# Patient Record
Sex: MALE | Race: OTHER | Employment: UNEMPLOYED | URBAN - METROPOLITAN AREA
[De-identification: names, ages, dates, MRNs, and addresses within clinical notes are randomized per-mention and may not be internally consistent; named-entity substitution may affect disease eponyms.]

---

## 2024-04-26 ENCOUNTER — HOSPITAL ENCOUNTER (EMERGENCY)
Facility: HOSPITAL | Age: 59
Discharge: HOME | End: 2024-04-26
Attending: EMERGENCY MEDICINE
Payer: MEDICAID

## 2024-04-26 ENCOUNTER — APPOINTMENT (OUTPATIENT)
Dept: CT IMAGING | Facility: HOSPITAL | Age: 59
End: 2024-04-26
Attending: EMERGENCY MEDICINE
Payer: MEDICAID

## 2024-04-26 VITALS
HEIGHT: 68 IN | RESPIRATION RATE: 20 BRPM | HEART RATE: 76 BPM | WEIGHT: 250 LBS | OXYGEN SATURATION: 96 % | BODY MASS INDEX: 37.89 KG/M2 | DIASTOLIC BLOOD PRESSURE: 71 MMHG | SYSTOLIC BLOOD PRESSURE: 118 MMHG | TEMPERATURE: 98 F

## 2024-04-26 DIAGNOSIS — N30.00 ACUTE CYSTITIS WITHOUT HEMATURIA: Primary | ICD-10-CM

## 2024-04-26 LAB
BILIRUB UR QL: NEGATIVE
COLOR UR: YELLOW
GLUCOSE UR-MCNC: NORMAL MG/DL
KETONES UR-MCNC: NEGATIVE MG/DL
LEUKOCYTE ESTERASE UR QL STRIP.AUTO: 500
NITRITE UR QL STRIP.AUTO: NEGATIVE
PH UR: 5.5 [PH] (ref 5–8)
PROT UR-MCNC: 70 MG/DL
RBC #/AREA URNS AUTO: >10 /HPF
SP GR UR STRIP: 1.02 (ref 1–1.03)
UROBILINOGEN UR STRIP-ACNC: 6
WBC #/AREA URNS AUTO: >50 /HPF
WBC CLUMPS UR QL AUTO: PRESENT /HPF

## 2024-04-26 PROCEDURE — 87186 SC STD MICRODIL/AGAR DIL: CPT | Performed by: EMERGENCY MEDICINE

## 2024-04-26 PROCEDURE — 81001 URINALYSIS AUTO W/SCOPE: CPT | Performed by: EMERGENCY MEDICINE

## 2024-04-26 PROCEDURE — 99285 EMERGENCY DEPT VISIT HI MDM: CPT

## 2024-04-26 PROCEDURE — 99284 EMERGENCY DEPT VISIT MOD MDM: CPT

## 2024-04-26 PROCEDURE — 74176 CT ABD & PELVIS W/O CONTRAST: CPT | Performed by: EMERGENCY MEDICINE

## 2024-04-26 PROCEDURE — 87088 URINE BACTERIA CULTURE: CPT | Performed by: EMERGENCY MEDICINE

## 2024-04-26 PROCEDURE — 87086 URINE CULTURE/COLONY COUNT: CPT | Performed by: EMERGENCY MEDICINE

## 2024-04-26 RX ORDER — ACETAMINOPHEN 500 MG
1000 TABLET ORAL ONCE
Status: COMPLETED | OUTPATIENT
Start: 2024-04-26 | End: 2024-04-26

## 2024-04-26 RX ORDER — CEPHALEXIN 500 MG/1
500 CAPSULE ORAL 4 TIMES DAILY
Qty: 40 CAPSULE | Refills: 0 | Status: SHIPPED | OUTPATIENT
Start: 2024-04-26 | End: 2024-05-06

## 2024-04-26 RX ORDER — CEPHALEXIN 500 MG/1
500 CAPSULE ORAL ONCE
Status: COMPLETED | OUTPATIENT
Start: 2024-04-26 | End: 2024-04-26

## 2024-04-26 NOTE — ED INITIAL ASSESSMENT (HPI)
Patient reports dysuria and \"specks\" in urine that began yesterday. Reported fever and chills, unknown temp.

## 2024-04-27 NOTE — ED PROVIDER NOTES
Patient Seen in: [facility] Emergency Department    History     Chief Complaint   Patient presents with    Urinary Symptoms       HPI    The patient presents to the ED complaining of urinary frequency, small months of urine and pain with urination.  He states that he had a fever today but denies any flank pain, vomiting or other complaints.    History reviewed.   Past Medical History:    Essential hypertension       History reviewed. History reviewed. No pertinent surgical history.      Medications :  (Not in a hospital admission)       No family history on file.    Smoking Status:   Social History     Socioeconomic History    Marital status:    Tobacco Use    Smoking status: Never    Smokeless tobacco: Never   Vaping Use    Vaping status: Never Used   Substance and Sexual Activity    Alcohol use: Yes    Drug use: Never       Constitutional and vital signs reviewed.      Social History and Family History elements reviewed from today, pertinent positives to the presenting problem noted.    Physical Exam     ED Triage Vitals   BP 04/26/24 1535 143/81   Pulse 04/26/24 1535 103   Resp 04/26/24 1535 20   Temp 04/26/24 1535 98.9 °F (37.2 °C)   Temp src 04/26/24 1535 Temporal   SpO2 04/26/24 1535 96 %   O2 Device 04/26/24 1830 None (Room air)       All measures to prevent infection transmission during my interaction with the patient were taken. Handwashing was performed prior to and after the exam.  Stethoscope and any equipment used during my examination was cleaned with super sani-cloth germicidal wipes following the exam.     Physical Exam  Vitals and nursing note reviewed.   Constitutional:       General: He is not in acute distress.     Appearance: He is well-developed. He is not ill-appearing.   HENT:      Head: Normocephalic and atraumatic.   Eyes:      General:         Right eye: No discharge.         Left eye: No discharge.      Conjunctiva/sclera: Conjunctivae normal.   Neck:      Trachea: No  tracheal deviation.   Cardiovascular:      Rate and Rhythm: Normal rate.   Pulmonary:      Effort: Pulmonary effort is normal. No respiratory distress.      Breath sounds: No stridor.   Abdominal:      General: There is no distension.      Palpations: Abdomen is soft.      Tenderness: There is no abdominal tenderness. There is no right CVA tenderness or left CVA tenderness.   Musculoskeletal:         General: No deformity.   Skin:     General: Skin is warm and dry.   Neurological:      Mental Status: He is alert and oriented to person, place, and time.   Psychiatric:         Mood and Affect: Mood normal.         Behavior: Behavior normal.         ED Course        Labs Reviewed   URINALYSIS WITH CULTURE REFLEX - Abnormal; Notable for the following components:       Result Value    Clarity Urine Turbid (*)     Blood Urine 2+ (*)     Protein Urine 70 (*)     Urobilinogen Urine 6 (*)     Leukocyte Esterase Urine 500 (*)     WBC Urine >50 (*)     RBC Urine >10 (*)     Squamous Epi. Cells Few (*)     WBC Clump Present (*)     All other components within normal limits   URINE CULTURE, ROUTINE       As Interpreted by me    Imaging Results Available and Reviewed while in ED: CT ABDOMEN+PELVIS KIDNEYSTONE 2D RNDR(NO IV,NO ORAL)(CPT=74176)    Result Date: 4/26/2024  CONCLUSION:  1. A nonobstructing right calyceal calculus is demonstrated. No evidence of ureterolithiasis or obstructive hydronephrosis.  2. Diffuse bladder wall thickening and perivesical fat stranding, concerning for cystitis.  3. Prostatomegaly with probable chronic outlet obstruction.   4. Uncomplicated distal colonic diverticulosis.  5. Hepatomegaly with underlying hepatic steatosis.  6. Splenomegaly.  7. Dilatation of the main pulmonary artery trunk may relate to underlying pulmonary hypertension.  8. Lesser incidental findings as above.      Dictated by (CST): Oni Pineda MD on 4/26/2024 at 6:44 PM     Finalized by (CST): Oni Pineda MD on 4/26/2024  at 6:52 PM         ED Medications Administered:   Medications   acetaminophen (Tylenol Extra Strength) tab 1,000 mg (1,000 mg Oral Given 4/26/24 1642)   cephalexin (Keflex) cap 500 mg (500 mg Oral Given 4/26/24 1741)         MDM     Vitals:    04/26/24 1631 04/26/24 1742 04/26/24 1830 04/26/24 1837   BP:   118/71    Pulse:   76    Resp:       Temp: (!) 101.2 °F (38.4 °C) 100.3 °F (37.9 °C)  98.3 °F (36.8 °C)   TempSrc: Oral Oral  Oral   SpO2:   96%    Weight:       Height:         *I personally reviewed and interpreted all ED vitals.    Pulse Ox: 96%, Room air, Normal     Monitor Interpretation:   normal sinus rhythm as interpreted by me.  The cardiac monitor was ordered to monitor heart rate.    Differential Diagnosis/ Diagnostic Considerations: UTI, urinary retention, urolithiasis, other    Complicating Factors: The patient already has does not have a problem list on file. to contribute to the complexity of this ED evaluation.    Medical Decision Making  The patient presents to the ED with urinary tract infection symptoms.  Nondistressed on examination.  Urinalysis with obvious infection.  No evidence for postvoid residual given post residual volume of around 50 miles after urination.  CT abdomen pelvis without evidence for obstructing ureteral stone or other signs for infection.  Patient started on antibiotics in the ED will be discharged home with the same.  Recommended urology follow-up and return precautions.    Problems Addressed:  Acute cystitis without hematuria: acute illness or injury that poses a threat to life or bodily functions    Amount and/or Complexity of Data Reviewed  Labs: ordered. Decision-making details documented in ED Course.  Radiology: ordered and independent interpretation performed. Decision-making details documented in ED Course.     Details: I personally reviewed the patient's CT abdomen pelvis and noted no obstructing urolithiasis    Risk  Prescription drug  management.        Condition upon leaving the department: Stable    Disposition and Plan     Clinical Impression:  1. Acute cystitis without hematuria        Disposition:  Discharge    Follow-up:  Verónica Hooks  [address]  [address]  [place] [facility] [address] [phone]    Schedule an appointment as soon as possible for a visit in 3 day(s)      Nilda Mccormick MD  [address]  [address]  [place] [address] [phone]    Schedule an appointment as soon as possible for a visit in 1 week(s)        Medications Prescribed:  Discharge Medication List as of 4/26/2024  6:31 PM        START taking these medications    Details   cephalexin (KEFLEX) 500 MG Oral Cap Take 1 capsule (500 mg total) by mouth 4 (four) times daily for 10 days., Normal, Disp-40 capsule, R-0

## 2024-04-28 NOTE — PROGRESS NOTES
ED Culture Callback Results Review    Pharmacist reviewed culture results from ED visit .    Final urine culture positive for E coli. Patient was prescribed Cephalexin (Keflex) on discharge. Current therapy is appropriate based on reported susceptibilities. No further intervention required at this time.    Jose Miguel Roy, PharmD  Emergency Medicine Pharmacist Specialist  04/28/24; 11:17 AM

## 2024-12-27 ENCOUNTER — APPOINTMENT (OUTPATIENT)
Dept: ULTRASOUND IMAGING | Facility: HOSPITAL | Age: 59
End: 2024-12-27
Attending: EMERGENCY MEDICINE
Payer: MEDICAID

## 2024-12-27 ENCOUNTER — HOSPITAL ENCOUNTER (EMERGENCY)
Facility: HOSPITAL | Age: 59
Discharge: HOME | End: 2024-12-27
Attending: EMERGENCY MEDICINE
Payer: MEDICAID

## 2024-12-27 VITALS
RESPIRATION RATE: 18 BRPM | SYSTOLIC BLOOD PRESSURE: 135 MMHG | HEART RATE: 88 BPM | BODY MASS INDEX: 36.37 KG/M2 | WEIGHT: 240 LBS | TEMPERATURE: 99 F | OXYGEN SATURATION: 95 % | DIASTOLIC BLOOD PRESSURE: 83 MMHG | HEIGHT: 68 IN

## 2024-12-27 DIAGNOSIS — N45.1 EPIDIDYMITIS: Primary | ICD-10-CM

## 2024-12-27 DIAGNOSIS — N45.2 ORCHITIS: ICD-10-CM

## 2024-12-27 LAB
ANION GAP SERPL CALC-SCNC: 7 MMOL/L (ref 0–18)
BASOPHILS # BLD AUTO: 0.05 X10(3) UL (ref 0–0.2)
BASOPHILS NFR BLD AUTO: 0.4 %
BILIRUB UR QL: NEGATIVE
BUN BLD-MCNC: 11 MG/DL (ref 9–23)
BUN/CREAT SERPL: 10.1 (ref 10–20)
CALCIUM BLD-MCNC: 9.4 MG/DL (ref 8.7–10.4)
CHLORIDE SERPL-SCNC: 102 MMOL/L (ref 98–112)
CLARITY UR: CLEAR
CO2 SERPL-SCNC: 29 MMOL/L (ref 21–32)
COLOR UR: YELLOW
CREAT BLD-MCNC: 1.09 MG/DL
DEPRECATED RDW RBC AUTO: 36.8 FL (ref 35.1–46.3)
EGFRCR SERPLBLD CKD-EPI 2021: 78 ML/MIN/1.73M2 (ref 60–?)
EOSINOPHIL # BLD AUTO: 0.04 X10(3) UL (ref 0–0.7)
EOSINOPHIL NFR BLD AUTO: 0.3 %
ERYTHROCYTE [DISTWIDTH] IN BLOOD BY AUTOMATED COUNT: 12.1 % (ref 11–15)
GLUCOSE BLD-MCNC: 135 MG/DL (ref 70–99)
GLUCOSE UR-MCNC: NORMAL MG/DL
HCT VFR BLD AUTO: 45.1 %
HGB BLD-MCNC: 15.2 G/DL
HGB UR QL STRIP.AUTO: NEGATIVE
IMM GRANULOCYTES # BLD AUTO: 0.13 X10(3) UL (ref 0–1)
IMM GRANULOCYTES NFR BLD: 1 %
KETONES UR-MCNC: NEGATIVE MG/DL
LEUKOCYTE ESTERASE UR QL STRIP.AUTO: 250
LYMPHOCYTES # BLD AUTO: 1.08 X10(3) UL (ref 1–4)
LYMPHOCYTES NFR BLD AUTO: 8.7 %
MCH RBC QN AUTO: 28.3 PG (ref 26–34)
MCHC RBC AUTO-ENTMCNC: 33.7 G/DL (ref 31–37)
MCV RBC AUTO: 84 FL
MONOCYTES # BLD AUTO: 0.79 X10(3) UL (ref 0.1–1)
MONOCYTES NFR BLD AUTO: 6.3 %
NEUTROPHILS # BLD AUTO: 10.39 X10 (3) UL (ref 1.5–7.7)
NEUTROPHILS # BLD AUTO: 10.39 X10(3) UL (ref 1.5–7.7)
NEUTROPHILS NFR BLD AUTO: 83.3 %
NITRITE UR QL STRIP.AUTO: NEGATIVE
OSMOLALITY SERPL CALC.SUM OF ELEC: 287 MOSM/KG (ref 275–295)
PH UR: 8 [PH] (ref 5–8)
PLATELET # BLD AUTO: 313 10(3)UL (ref 150–450)
POTASSIUM SERPL-SCNC: 4.4 MMOL/L (ref 3.5–5.1)
PROT UR-MCNC: 20 MG/DL
RBC # BLD AUTO: 5.37 X10(6)UL
RBC #/AREA URNS AUTO: >10 /HPF
SODIUM SERPL-SCNC: 138 MMOL/L (ref 136–145)
SP GR UR STRIP: 1.03 (ref 1–1.03)
UROBILINOGEN UR STRIP-ACNC: 6
WBC # BLD AUTO: 12.5 X10(3) UL (ref 4–11)

## 2024-12-27 PROCEDURE — 99285 EMERGENCY DEPT VISIT HI MDM: CPT

## 2024-12-27 PROCEDURE — 99284 EMERGENCY DEPT VISIT MOD MDM: CPT

## 2024-12-27 PROCEDURE — 76870 US EXAM SCROTUM: CPT | Performed by: EMERGENCY MEDICINE

## 2024-12-27 PROCEDURE — 85025 COMPLETE CBC W/AUTO DIFF WBC: CPT | Performed by: EMERGENCY MEDICINE

## 2024-12-27 PROCEDURE — 80048 BASIC METABOLIC PNL TOTAL CA: CPT | Performed by: EMERGENCY MEDICINE

## 2024-12-27 PROCEDURE — 93975 VASCULAR STUDY: CPT | Performed by: EMERGENCY MEDICINE

## 2024-12-27 PROCEDURE — 36415 COLL VENOUS BLD VENIPUNCTURE: CPT

## 2024-12-27 PROCEDURE — 87086 URINE CULTURE/COLONY COUNT: CPT | Performed by: EMERGENCY MEDICINE

## 2024-12-27 PROCEDURE — 81001 URINALYSIS AUTO W/SCOPE: CPT | Performed by: EMERGENCY MEDICINE

## 2024-12-27 RX ORDER — IBUPROFEN 600 MG/1
600 TABLET, FILM COATED ORAL EVERY 8 HOURS PRN
Qty: 21 TABLET | Refills: 0 | Status: SHIPPED | OUTPATIENT
Start: 2024-12-27 | End: 2025-01-03

## 2024-12-27 RX ORDER — LEVOFLOXACIN 500 MG/1
500 TABLET, FILM COATED ORAL ONCE
Status: COMPLETED | OUTPATIENT
Start: 2024-12-27 | End: 2024-12-27

## 2024-12-27 RX ORDER — TRAMADOL HYDROCHLORIDE 50 MG/1
50 TABLET ORAL EVERY 6 HOURS PRN
Qty: 10 TABLET | Refills: 0 | Status: SHIPPED | OUTPATIENT
Start: 2024-12-27 | End: 2025-01-03

## 2024-12-27 RX ORDER — LEVOFLOXACIN 500 MG/1
500 TABLET, FILM COATED ORAL DAILY
Qty: 10 TABLET | Refills: 0 | Status: SHIPPED | OUTPATIENT
Start: 2024-12-27 | End: 2025-01-09

## 2024-12-27 NOTE — DISCHARGE INSTRUCTIONS
Levaquin as directed. Motrin as needed for pain. Tramadol for break through pain. Tight fitting underpants. Follow up with Urology. Return for fever greater than 101 or worse condition.

## 2024-12-27 NOTE — ED PROVIDER NOTES
Patient Seen in: [facility] Emergency Department      History     Chief Complaint   Patient presents with    Testicular Swelling            Stated Complaint: right testicle pain/swelling    Subjective:   HPI      Patient presents the emergency department complaining of right sided testicular pain and swelling for the last 3 to 4 days.  He states that he had some chills today.  There is no vomiting or diarrhea.  There is no other aggravating relieving factors.  There is no dysuria.  There was no trauma.    Objective:     Past Medical History:    Essential hypertension              History reviewed. No pertinent surgical history.             Social History     Socioeconomic History    Marital status:    Tobacco Use    Smoking status: Never    Smokeless tobacco: Never   Vaping Use    Vaping status: Never Used   Substance and Sexual Activity    Alcohol use: Yes    Drug use: Never     Social Drivers of Health     Food Insecurity: No Food Insecurity (11/16/2024)    Received from [facility]    Hunger Vital Sign     Worried About Running Out of Food in the Last Year: Never true     Ran Out of Food in the Last Year: Never true   Transportation Needs: No Transportation Needs (11/16/2024)    Received from [facility]    PRAPARE - Transportation     Lack of Transportation (Medical): No     Lack of Transportation (Non-Medical): No   Housing Stability: Low Risk  (11/16/2024)    Received from [facility]    Housing Stability Vital Sign     Unable to Pay for Housing in the Last Year: No     Number of Times Moved in the Last Year: 1     Homeless in the Last Year: No                  Physical Exam     ED Triage Vitals [12/27/24 1257]   /84   Pulse 78   Resp 16   Temp 100 °F (37.8 °C)   Temp src Oral   SpO2 97 %   O2 Device None (Room air)       Current Vitals:   Vital Signs  BP: 135/83  Pulse: 88  Resp: 18  Temp: 99.1 °F (37.3 °C)  Temp src:  Oral    Oxygen Therapy  SpO2: 95 %  O2 Device: None (Room air)        Physical Exam  Vitals and nursing note reviewed.   Constitutional:       General: He is not in acute distress.     Appearance: He is well-developed.   HENT:      Head: Normocephalic.      Nose: Nose normal.      Mouth/Throat:      Mouth: Mucous membranes are moist.   Eyes:      Conjunctiva/sclera: Conjunctivae normal.   Cardiovascular:      Rate and Rhythm: Normal rate and regular rhythm.      Heart sounds: No murmur heard.  Pulmonary:      Effort: Pulmonary effort is normal. No respiratory distress.      Breath sounds: Normal breath sounds.   Abdominal:      General: There is no distension.      Palpations: Abdomen is soft.      Tenderness: There is no abdominal tenderness.   Genitourinary:     Comments: There is diffusely swollen and tender right testicle relative to the left.  There is no fluctuance.  There is no cellulitic changes the scrotum.  Musculoskeletal:         General: No tenderness. Normal range of motion.      Cervical back: Normal range of motion and neck supple.   Skin:     General: Skin is warm and dry.      Findings: No rash.   Neurological:      Mental Status: He is alert and oriented to person, place, and time.             ED Course     Labs Reviewed   CBC WITH DIFFERENTIAL WITH PLATELET - Abnormal; Notable for the following components:       Result Value    WBC 12.5 (*)     Neutrophil Absolute Prelim 10.39 (*)     Neutrophil Absolute 10.39 (*)     All other components within normal limits   BASIC METABOLIC PANEL (8) - Abnormal; Notable for the following components:    Glucose 135 (*)     All other components within normal limits   URINALYSIS, ROUTINE - Abnormal; Notable for the following components:    Protein Urine 20 (*)     Urobilinogen Urine 6 (*)     Leukocyte Esterase Urine 250 (*)     WBC Urine 21-50 (*)     RBC Urine >10 (*)     Squamous Epi. Cells Few (*)     All other components within normal limits   URINE CULTURE,  ROUTINE                   [facility]              Medical Decision Making  Differential diagnosis considered for testicular torsion, testicular mass, epididymitis, orchitis.    Problems Addressed:  Epididymitis: acute illness or injury  Orchitis: acute illness or injury    Amount and/or Complexity of Data Reviewed  Labs: ordered. Decision-making details documented in ED Course.     Details: Mildly elevated white blood cell count.  Radiology: ordered and independent interpretation performed. Decision-making details documented in ED Course.     Details: Ultrasound shows epididymoorchitis.  Discussion of management or test interpretation with external provider(s): Discussed with Dr. Funk, urology.  And recommended Levaquin for 14 days and follow-up in clinic next week.  Patient understands to return for shaking chills or fever over 101.    Risk  Prescription drug management.        Disposition and Plan     Clinical Impression:  1. Epididymitis    2. Orchitis         Disposition:  Discharge  12/27/2024  5:13 pm    Follow-up:  Danilo House MD  [address]  [facility] [address]  [phone]    Schedule an appointment as soon as possible for a visit            Medications Prescribed:  Discharge Medication List as of 12/27/2024  5:17 PM        START taking these medications    Details   ibuprofen 600 MG Oral Tab Take 1 tablet (600 mg total) by mouth every 8 (eight) hours as needed for Pain or Fever., Normal, Disp-21 tablet, R-0      traMADol 50 MG Oral Tab Take 1 tablet (50 mg total) by mouth every 6 (six) hours as needed., Normal, Disp-10 tablet, R-0      levoFLOXacin 500 MG Oral Tab Take 1 tablet (500 mg total) by mouth daily for 13 days., Normal, Disp-10 tablet, R-0                 Supplementary Documentation:

## 2025-01-16 NOTE — PROGRESS NOTES
[facility] Urology  Initial Office Consultation    HPI:   Ayush Myles is a 59 year old male with PMHx of HTN here today for ER follow-up.     The patient was seen in the ER on 12/27/24 with c/o right testicular pain and swelling. He was diagnosed with right epididymo-orchitis and was discharged with a 10 day course of Levaquin.     The patient states he still notices swelling in his testicle, however no pain or urinary issues. He states the swelling began after a fall down the stairs approximately a month ago.     Scrotal US showed hypervascular and heterogeneous right testis and epididymis.  No evidence of testicular torsion.  No discrete intratesticular mass is identified.  Findings are most suggestive of epididymo-orchitis.  However, after resolution of acute illness, follow-up scrotal ultrasound is recommended in 3-6 weeks to exclude underlying lesion in the right testicle. Normal left testis and epididymis.  Left epididymal head cyst measuring 0.6 cm.     The patient also complains of dribbling.  Nocturia x 2  Frequency q 2 hours  Duration:  9 months    denies symptoms of fever.  denies dysuria  denies hematuria.  complains of dribbling   complains of uti's, one 04/2024    IPSS 30 (5/5/5/5/4/4/2) with QoL Score of 6. PVR 43 mL.    Water intake: 4 bottles of water per day  Caffeine intake:  1 large coffee per day, occasional soda  Bowel function:  regular    Prior Treatment:    Medications:  antibiotics   Surgical therapies:  no    Family history   Prostate cancer:  no   Enlarged prostate:  no      PSA history:  None in system     Past Medical History:    Essential hypertension     No past surgical history on file.  No family history on file.  Social History     Socioeconomic History    Marital status:    Tobacco Use    Smoking status: Never    Smokeless tobacco: Never   Vaping Use    Vaping status: Never Used   Substance and Sexual Activity    Alcohol use: Yes    Drug use: Never     Social Drivers  of Health     Food Insecurity: No Food Insecurity (11/16/2024)    Received from [facility]    Hunger Vital Sign     Worried About Running Out of Food in the Last Year: Never true     Ran Out of Food in the Last Year: Never true   Transportation Needs: No Transportation Needs (11/16/2024)    Received from [facility]    PRAPARE - Transportation     Lack of Transportation (Medical): No     Lack of Transportation (Non-Medical): No   Housing Stability: Low Risk  (11/16/2024)    Received from [facility]    Housing Stability Vital Sign     Unable to Pay for Housing in the Last Year: No     Number of Times Moved in the Last Year: 1     Homeless in the Last Year: No     No current outpatient medications on file.       Allergies: Patient has no known allergies.    REVIEW OF SYSTEMS:  Review of Systems   All other systems reviewed and are negative.        EXAM:  There were no vitals taken for this visit.    Physical Exam  Constitutional:       Appearance: Normal appearance.   HENT:      Head: Normocephalic and atraumatic.      Mouth/Throat:      Mouth: Mucous membranes are moist.   Pulmonary:      Effort: Pulmonary effort is normal.   Abdominal:      General: Abdomen is flat.      Palpations: Abdomen is soft.   Genitourinary:     Penis: Normal and uncircumcised.       Testes:         Right: Swelling present. Mass or tenderness not present.         Left: Mass, tenderness or swelling not present.      Epididymis:      Right: Enlarged. No mass or tenderness.      Left: Not inflamed or enlarged. No mass or tenderness.      Prostate: Enlarged. Not tender and no nodules present.      Comments: Difficult to fully palpate prostate due to body habitus  Skin:     General: Skin is warm and dry.   Neurological:      Mental Status: He is alert and oriented to person, place, and time.   Psychiatric:         Mood and Affect: Mood normal.         Thought Content: Thought  content normal.          LABS:  No results found for: \"PSA\", \"QPSA\", \"TOTPSASCREEN\"  Lab Results   Component Value Date    WBC 12.5 (H) 12/27/2024    RBC 5.37 12/27/2024    HGB 15.2 12/27/2024    HCT 45.1 12/27/2024    MCV 84.0 12/27/2024    MCH 28.3 12/27/2024    MCHC 33.7 12/27/2024    RDW 12.1 12/27/2024    .0 12/27/2024     Lab Results   Component Value Date     (H) 12/27/2024    BUN 11 12/27/2024    BUNCREA 10.1 12/27/2024    CREATSERUM 1.09 12/27/2024    ANIONGAP 7 12/27/2024    CA 9.4 12/27/2024     12/27/2024    K 4.4 12/27/2024     12/27/2024    CO2 29.0 12/27/2024     No results found for: \"PTP\", \"PT\", \"INR\"    IMAGING:  US SCROTUM W/ DOPPLER (CPT=93975/79196)    Result Date: 12/27/2024  PROCEDURE: US SCROTUM W/DOPPLER (CPT=93975/71864)  COMPARISON: None.  INDICATIONS: Right testicle pain/swelling x 4 days  TECHNIQUE: The scrotum was evaluated with gray scale and color duplex Doppler sonography.  FINDINGS:   RIGHT TESTICLE: 4.2 x 3.4 x 3.2 cm.  The parenchyma is heterogeneous.  No masses.  EPIDIDYMIS: Enlarged and heterogeneous.  OTHER: Negative.  No varicocele or hydrocele.  DOPPLER: Increased arterial and venous flow in the testicle with color and pulsed Doppler.  Increased epididymal flow.   LEFT TESTICLE: 3.0 x 3.7 x 3.2 cm.  Normal echogenicity.  No masses.  EPIDIDYMIS: Normal size and echogenicity.  An epididymal head cyst measures 0.6 x 0.5 x 0.6 cm. OTHER: Negative.  No varicocele or hydrocele.  DOPPLER: Normal arterial and venous flow in the testicle with color pulsed Doppler.  Normal epididymal flow.          CONCLUSION:   Hypervascular and heterogeneous right testis and epididymis.  No evidence of testicular torsion.  No discrete intratesticular mass is identified.  Findings are most suggestive of epididymo-orchitis.  However, after resolution of acute illness, follow-up scrotal ultrasound is recommended in 3-6 weeks to exclude underlying lesion in the right testicle.   Normal left testis and epididymis.  Left epididymal head cyst measuring 0.6 cm.    elm-remote    Dictated by (CST): Chris Pugh MD on 12/27/2024 at 4:05 PM     Finalized by (CST): Chris Pugh MD on 12/27/2024 at 4:12 PM            IMPRESSION:  Right Epididymo-orchitis  BPH with LUTS    PLAN:  - Conservative management  - Trial of Tamsulosin 0.4 mg at bedtime  - Screening PSA in 8-12 weeks  - Scrotal US   - Follow-up in 3 months    LORELEI Andrade  1/17/2025

## 2025-01-17 ENCOUNTER — OFFICE VISIT (OUTPATIENT)
Dept: SURGERY | Facility: CLINIC | Age: 60
End: 2025-01-17

## 2025-01-17 VITALS
BODY MASS INDEX: 36.37 KG/M2 | DIASTOLIC BLOOD PRESSURE: 73 MMHG | SYSTOLIC BLOOD PRESSURE: 110 MMHG | HEART RATE: 69 BPM | WEIGHT: 240 LBS | HEIGHT: 68 IN

## 2025-01-17 DIAGNOSIS — Z12.5 ENCOUNTER FOR SCREENING PROSTATE SPECIFIC ANTIGEN (PSA) MEASUREMENT: ICD-10-CM

## 2025-01-17 DIAGNOSIS — N40.1 BPH WITH OBSTRUCTION/LOWER URINARY TRACT SYMPTOMS: ICD-10-CM

## 2025-01-17 DIAGNOSIS — N13.8 BPH WITH OBSTRUCTION/LOWER URINARY TRACT SYMPTOMS: ICD-10-CM

## 2025-01-17 DIAGNOSIS — N45.3 EPIDIDYMO-ORCHITIS: Primary | ICD-10-CM

## 2025-01-17 LAB
BILIRUB UR QL: NEGATIVE
CLARITY UR: CLEAR
GLUCOSE UR-MCNC: NORMAL MG/DL
HGB UR QL STRIP.AUTO: NEGATIVE
KETONES UR-MCNC: NEGATIVE MG/DL
LEUKOCYTE ESTERASE UR QL STRIP.AUTO: 75
NITRITE UR QL STRIP.AUTO: NEGATIVE
PH UR: 6.5 [PH] (ref 5–8)
PROT UR-MCNC: NEGATIVE MG/DL
SP GR UR STRIP: 1.01 (ref 1–1.03)
UROBILINOGEN UR STRIP-ACNC: NORMAL

## 2025-01-17 PROCEDURE — 87086 URINE CULTURE/COLONY COUNT: CPT

## 2025-01-17 PROCEDURE — 81001 URINALYSIS AUTO W/SCOPE: CPT

## 2025-01-17 RX ORDER — TAMSULOSIN HYDROCHLORIDE 0.4 MG/1
0.4 CAPSULE ORAL DAILY
Qty: 90 CAPSULE | Refills: 3 | Status: SHIPPED | OUTPATIENT
Start: 2025-01-17 | End: 2026-01-12

## 2025-01-17 NOTE — PATIENT INSTRUCTIONS
Drink plenty of fluids and try over the counter NSAIDs (such as Ibuprofen or Aleve) as needed, ice and elevation, wear supportive underwear (vasectomy underwear, boxer briefs, jock strap) and try to avoid activities which exacerbate pain such as prolonged sitting or heavy lifting. Try hot baths/hot packs, and stress relief such as meditation.     Behavioral management includes timed voiding (going to the bathroom on a schedule every 1-4 hours), double voiding (trying to pee twice), avoiding bladder irritants (coffee, tea, soda, pop, alcohol, spicy and acidic foods), compression stockings, elevation of feet, voiding prior to bedtime, avoiding fluids 3 to 4 hours before bedtime and constipation management.     Call [phone] to schedule a scrotal ultrasound.    Go to the lab for a PSA test in 8-12 weeks. Drink lots of water (40-60 oz per day), avoid sexual stimulation, and avoid bike riding/motorcycle riding for 3-5 days before the test.    Follow-up in 3 months

## 2025-02-25 ENCOUNTER — TELEPHONE (OUTPATIENT)
Dept: SURGERY | Facility: CLINIC | Age: 60
End: 2025-02-25

## (undated) NOTE — LETTER
02/25/25        Ayush Myles  [address]  [place] [address]      Dear Ayush,    Our records indicate that you have outstanding lab work and or testing that was ordered for you and has not yet been completed:  US SCROTUM W/ DOPPLER     To provide you with the best possible care, please complete these orders at your earliest convenience. If you have recently completed these orders please disregard this letter.     If you have any questions please call the office at [phone].    Thank you,       Urology Staff